# Patient Record
Sex: MALE | Race: WHITE | Employment: FULL TIME | URBAN - METROPOLITAN AREA
[De-identification: names, ages, dates, MRNs, and addresses within clinical notes are randomized per-mention and may not be internally consistent; named-entity substitution may affect disease eponyms.]

---

## 2017-10-26 ENCOUNTER — OFFICE VISIT (OUTPATIENT)
Dept: FAMILY MEDICINE CLINIC | Age: 52
End: 2017-10-26
Payer: COMMERCIAL

## 2017-10-26 VITALS
WEIGHT: 249 LBS | HEIGHT: 67 IN | BODY MASS INDEX: 39.08 KG/M2 | TEMPERATURE: 97.2 F | SYSTOLIC BLOOD PRESSURE: 142 MMHG | HEART RATE: 90 BPM | RESPIRATION RATE: 18 BRPM | DIASTOLIC BLOOD PRESSURE: 70 MMHG | OXYGEN SATURATION: 98 %

## 2017-10-26 DIAGNOSIS — G89.29 CHRONIC PAIN OF RIGHT KNEE: Primary | ICD-10-CM

## 2017-10-26 DIAGNOSIS — I10 ESSENTIAL HYPERTENSION: ICD-10-CM

## 2017-10-26 DIAGNOSIS — M25.462 PAIN AND SWELLING OF KNEE, LEFT: ICD-10-CM

## 2017-10-26 DIAGNOSIS — M25.462 KNEE EFFUSION, LEFT: ICD-10-CM

## 2017-10-26 DIAGNOSIS — E66.2 CLASS 2 OBESITY WITH ALVEOLAR HYPOVENTILATION WITHOUT SERIOUS COMORBIDITY WITH BODY MASS INDEX (BMI) OF 36.0 TO 36.9 IN ADULT (HCC): ICD-10-CM

## 2017-10-26 DIAGNOSIS — M10.9 GOUT OF KNEE, UNSPECIFIED CAUSE, UNSPECIFIED CHRONICITY, UNSPECIFIED LATERALITY: ICD-10-CM

## 2017-10-26 DIAGNOSIS — M25.561 CHRONIC PAIN OF RIGHT KNEE: Primary | ICD-10-CM

## 2017-10-26 DIAGNOSIS — M25.562 PAIN AND SWELLING OF KNEE, LEFT: ICD-10-CM

## 2017-10-26 LAB
ALBUMIN SERPL-MCNC: 4.4 G/DL
ALP BLD-CCNC: 75 U/L
ALT SERPL-CCNC: 20 U/L
ANION GAP SERPL CALCULATED.3IONS-SCNC: 11 MMOL/L
AST SERPL-CCNC: 21 U/L
BASOPHILS ABSOLUTE: 0 /ΜL
BASOPHILS RELATIVE PERCENT: 0.4 %
BILIRUB SERPL-MCNC: 1.3 MG/DL (ref 0.1–1.4)
BUN BLDV-MCNC: 14 MG/DL
CALCIUM SERPL-MCNC: 9.5 MG/DL
CHLORIDE BLD-SCNC: 101 MMOL/L
CO2: 27 MMOL/L
CREAT SERPL-MCNC: 0.9 MG/DL
EOSINOPHILS ABSOLUTE: 0.2 /ΜL
EOSINOPHILS RELATIVE PERCENT: 2.3 %
GFR CALCULATED: >60
GLUCOSE BLD-MCNC: 114 MG/DL
HCT VFR BLD CALC: 43.4 % (ref 41–53)
HEMOGLOBIN: 14.6 G/DL (ref 13.5–17.5)
LYMPHOCYTES ABSOLUTE: 1.8 /ΜL
LYMPHOCYTES RELATIVE PERCENT: 21.8 %
MCH RBC QN AUTO: 31.7 PG
MCHC RBC AUTO-ENTMCNC: 33.7 G/DL
MCV RBC AUTO: 94 FL
MONOCYTES ABSOLUTE: 0.6 /ΜL
MONOCYTES RELATIVE PERCENT: 7.8 %
NEUTROPHILS ABSOLUTE: 5.6 /ΜL
NEUTROPHILS RELATIVE PERCENT: 67.7 %
PDW BLD-RTO: 13.7 %
PLATELET # BLD: 257 K/ΜL
PMV BLD AUTO: 8.7 FL
POTASSIUM SERPL-SCNC: 4.5 MMOL/L
RBC # BLD: 4.62 10^6/ΜL
SODIUM BLD-SCNC: 139 MMOL/L
TOTAL PROTEIN: 7.6
URIC ACID: 7.7
WBC # BLD: 8.3 10^3/ML

## 2017-10-26 PROCEDURE — 99202 OFFICE O/P NEW SF 15 MIN: CPT | Performed by: INTERNAL MEDICINE

## 2017-10-26 PROCEDURE — 96372 THER/PROPH/DIAG INJ SC/IM: CPT | Performed by: INTERNAL MEDICINE

## 2017-10-26 RX ORDER — TRIAMCINOLONE ACETONIDE 40 MG/ML
40 INJECTION, SUSPENSION INTRA-ARTICULAR; INTRAMUSCULAR ONCE
Status: COMPLETED | OUTPATIENT
Start: 2017-10-26 | End: 2017-10-26

## 2017-10-26 RX ORDER — METHYLPREDNISOLONE 4 MG/1
TABLET ORAL
Qty: 1 KIT | Refills: 0 | Status: SHIPPED | OUTPATIENT
Start: 2017-10-26 | End: 2017-11-01

## 2017-10-26 RX ADMIN — TRIAMCINOLONE ACETONIDE 40 MG: 40 INJECTION, SUSPENSION INTRA-ARTICULAR; INTRAMUSCULAR at 11:18

## 2017-10-26 ASSESSMENT — PATIENT HEALTH QUESTIONNAIRE - PHQ9
2. FEELING DOWN, DEPRESSED OR HOPELESS: 0
SUM OF ALL RESPONSES TO PHQ9 QUESTIONS 1 & 2: 0
SUM OF ALL RESPONSES TO PHQ QUESTIONS 1-9: 0
1. LITTLE INTEREST OR PLEASURE IN DOING THINGS: 0

## 2017-10-26 ASSESSMENT — ENCOUNTER SYMPTOMS
BACK PAIN: 0
COLOR CHANGE: 1
DIARRHEA: 0
SHORTNESS OF BREATH: 0
CONSTIPATION: 0
ABDOMINAL PAIN: 0

## 2017-10-26 NOTE — PROGRESS NOTES
Visit Information    Have you changed or started any medications since your last visit including any over-the-counter medicines, vitamins, or herbal medicines? no   Are you having any side effects from any of your medications? -  no  Have you stopped taking any of your medications? Is so, why? -  no    Have you seen any other physician or provider since your last visit? Yes - Records Obtained  Have you had any other diagnostic tests since your last visit? Yes - Records Obtained  Have you been seen in the emergency room and/or had an admission to a hospital since we last saw you? Yes - Records Obtained  Have you had your routine dental cleaning in the past 6 months? no    Have you activated your [employer] account? If not, what are your barriers?  No: Discussed with patient      Patient Care Team:  Sia Trujillo DO as PCP - General (Family Medicine)    Medical History Review  Past Medical, Family, and Social History reviewed and does not contribute to the patient presenting condition    Health Maintenance   Topic Date Due    Hepatitis C screen  1965    HIV screen  12/24/1979    DTaP/Tdap/Td vaccine (1 - Tdap) 12/24/1983    Lipid screen  12/24/2004    Diabetes screen  12/24/2004    Colon cancer screen colonoscopy  12/24/2014    Flu vaccine (1) 09/01/2017

## 2017-10-26 NOTE — PROGRESS NOTES
[address] [address]  Dept: [phone]  Dept Fax: [phone]    Sergo Robert is a 45 y.o. male who presents today for his medical conditions/complaints as noted below. Sergo Robert is c/o of   Chief Complaint   Patient presents with    Knee Pain     Right knee pain since august , twisted knee was seen in ER, swollen         HPI:     [facility] but no fall or LOC. Has never really had joint issues before. Went to ED wih=thin first week of injury due to pain ans swelling and issues with movement. They treated it as acute straoin /pain at that time . Pt was given totardol and then small script for naproxen    [facility] ethat time pt state ssymptoms have gradually worsned. He states increase in swelling and redness to that left knee. States  sever pain with any standing or twising movement. jsut some some throbbing when he is at rest and/or sitting. Has not fallen due to weakness or pain but states his knee will pop occassionally and \"give out \" since initial injury . No prior issues with that knee . Has last close to 100 lbs in the last 2 years. Has not seen a PCP. Has done ibuprofen /naproxen given by ED initially. Has also been trying tylenol arthritis recently. Has also been using a knee brace for the last 6 weeks while at work . Rest, ice and elevation while not at work but does work as a  so on feet a lot at work. Also has tried [place], tiger balm, Aspercreme for several weeks over the last 2 months for the pian without any relief. No fever or chills. No cp/sob. No leg swelling . Some stretching but no PT or real exercises at home. Knee Pain    The incident occurred more than 1 week ago. The incident occurred at work. The injury mechanism was a twisting injury and an inversion injury. The pain is present in the left knee and left ankle.  The pain is at a shortness of breath. Cardiovascular: Negative for chest pain, palpitations and leg swelling. Gastrointestinal: Negative for abdominal pain, constipation and diarrhea. Musculoskeletal: Positive for gait problem and joint swelling. Negative for arthralgias, back pain, myalgias, neck pain and neck stiffness. Skin: Positive for color change. Negative for pallor, rash and wound. Neurological: Negative for dizziness, numbness and headaches. Psychiatric/Behavioral: Negative for sleep disturbance. Objective:     Physical Exam   Constitutional: He is oriented to person, place, and time. He appears well-developed and well-nourished. Cardiovascular: Normal rate, regular rhythm and normal heart sounds. Pulmonary/Chest: Effort normal and breath sounds normal.   Abdominal: Soft. Bowel sounds are normal. There is no splenomegaly or hepatomegaly. There is no tenderness. Neurological: He is alert and oriented to person, place, and time. No cranial nerve deficit. Skin: Skin is warm and dry. No rash noted. Psychiatric: He has a normal mood and affect. Nursing note and vitals reviewed. BP (!) 142/70 (Site: Left Arm, Position: Sitting, Cuff Size: Large Adult)   Pulse 90   Temp 97.2 °F (36.2 °C) (Tympanic)   Resp 18   Ht 5' 7\" (1.702 m)   Wt 249 lb (112.9 kg)   SpO2 98%   BMI 39.00 kg/m²     Assessment:      1. Chronic pain of right knee  MRI Knee Left WO Contrast    methylPREDNISolone (MEDROL DOSEPACK) 4 MG tablet    Amb External Referral To Physical Therapy   2. Pain and swelling of knee, left  XR FOOT LEFT (MIN 3 VIEWS)    MRI Knee Left WO Contrast    triamcinolone acetonide (KENALOG-40) injection 40 mg    methylPREDNISolone (MEDROL DOSEPACK) 4 MG tablet    Amb External Referral To Physical Therapy    Comprehensive Metabolic Panel   3.  Knee effusion, left  XR FOOT LEFT (MIN 3 VIEWS)    MRI Knee Left WO Contrast    triamcinolone acetonide (KENALOG-40) injection 40 mg    methylPREDNISolone

## 2017-11-06 ENCOUNTER — HOSPITAL ENCOUNTER (OUTPATIENT)
Dept: MRI IMAGING | Age: 52
Discharge: HOME | End: 2017-11-06
Payer: COMMERCIAL

## 2017-11-06 DIAGNOSIS — G89.29 CHRONIC PAIN OF RIGHT KNEE: ICD-10-CM

## 2017-11-06 DIAGNOSIS — M25.462 KNEE EFFUSION, LEFT: ICD-10-CM

## 2017-11-06 DIAGNOSIS — M25.462 PAIN AND SWELLING OF KNEE, LEFT: ICD-10-CM

## 2017-11-06 DIAGNOSIS — M25.562 PAIN AND SWELLING OF KNEE, LEFT: ICD-10-CM

## 2017-11-06 DIAGNOSIS — M25.561 CHRONIC PAIN OF RIGHT KNEE: ICD-10-CM

## 2017-11-06 PROCEDURE — 73721 MRI JNT OF LWR EXTRE W/O DYE: CPT

## 2017-11-07 DIAGNOSIS — M84.451A SUBCHONDRAL INSUFFICIENCY FRACTURE OF CONDYLE OF RIGHT FEMUR, INITIAL ENCOUNTER (HCC): Primary | ICD-10-CM

## 2017-11-08 ENCOUNTER — OFFICE VISIT (OUTPATIENT)
Dept: ORTHOPEDIC SURGERY | Age: 52
End: 2017-11-08
Payer: COMMERCIAL

## 2017-11-08 VITALS — HEIGHT: 67 IN | BODY MASS INDEX: 37.2 KG/M2 | WEIGHT: 237 LBS

## 2017-11-08 DIAGNOSIS — S83.231A COMPLEX TEAR OF MEDIAL MENISCUS OF RIGHT KNEE, UNSPECIFIED WHETHER OLD OR CURRENT TEAR, INITIAL ENCOUNTER: Primary | ICD-10-CM

## 2017-11-08 DIAGNOSIS — M17.11 ARTHRITIS OF RIGHT KNEE: ICD-10-CM

## 2017-11-08 DIAGNOSIS — S83.271A COMPLEX TEAR OF LATERAL MENISCUS OF RIGHT KNEE, UNSPECIFIED WHETHER OLD OR CURRENT TEAR, INITIAL ENCOUNTER: ICD-10-CM

## 2017-11-08 PROCEDURE — 99243 OFF/OP CNSLTJ NEW/EST LOW 30: CPT | Performed by: ORTHOPAEDIC SURGERY

## 2017-11-08 RX ORDER — METHYLPREDNISOLONE 4 MG/1
4 TABLET ORAL SEE ADMIN INSTRUCTIONS
COMMUNITY
End: 2017-11-15 | Stop reason: ALTCHOICE

## 2017-11-08 ASSESSMENT — ENCOUNTER SYMPTOMS
TROUBLE SWALLOWING: 0
CONSTIPATION: 0
ABDOMINAL PAIN: 0
SHORTNESS OF BREATH: 0
VOICE CHANGE: 0
COUGH: 0
NAUSEA: 0
WHEEZING: 0
VOMITING: 0
CHOKING: 0
DIARRHEA: 0

## 2017-11-08 NOTE — LETTER
Dr. Courtney Zuñiga  [facility]  [address] [address]  Dept: [phone]  Dept Fax: [phone]    11/19/17    Patient: Jaylon Hannon  YOB: 1965    Dear Dorys Justice, ,    I had the pleasure of seeing one of your patients, Ksenia Knows today in the office. Below are the relevant portions of my assessment and plan of care. IMPRESSION:   1. Complex tear of medial meniscus of right knee, unspecified whether old or current tear, initial encounter    2. Arthritis of right knee    3. Complex tear of lateral meniscus of right knee, unspecified whether old or current tear, initial encounter      PLAN:   Discussed etiologies and natural histories of right knee meniscal tear and right knee arthritis. The bone marrow edema noted in the medial femoral condyle and medial femoral distal metaphysis is most consistent with arthritic changes especially with the patient's history of trauma. The treatment options may include anti inflammatories, bracing, injections further imaging, and last resort surgery. The patient opted for non-operative treatment and will finish up his medrol dose pack. If pain starts to flare up options would include anti-inflammatory, PO steroids, and cortisone injection. Patient can be activity as tolerated. Return to clinic as needed. Call the office immediately with any problems. Thank you for allowing me to participate in the care of this patient. I will keep you updated on this patient's follow up and I look forward to serving you and your patients again in the future. Please don't hesitate to contact me at my mobile number [phone].         Ernst Arizmendi

## 2017-11-08 NOTE — PROGRESS NOTES
[facility]  [facility]  [address] [place] [address] [address] [facility] [address]  Dept: [phone]    Ambulatory Orthopedic Consult      CHIEF COMPLAINT:    Chief Complaint   Patient presents with    Knee Pain     right        HISTORY OF PRESENT ILLNESS:      The patient is a 45 y.o. male who is being seen at the request of  Jesus Manuel Cruz DO for consultation and evaluation of right knee pain notes pain for at least 3 months. He denies inciting trauma however he states he climbed 13 flights of stairs approximately 3 months ago noticed pain approximately care home up the stairs. He notes marked improvement with the Medrol Dosepak. He is also try Tylenol PM and Advil PM.  He has also tried knee brace and topical anti-inflammatories. He notes pain with walking long distances. He has a history of gout in the left knee and the left foot. Past Medical History:    Past Medical History:   Diagnosis Date    Sleep apnea        Past Surgical History:    Past Surgical History:   Procedure Laterality Date    CHOLECYSTECTOMY      HERNIA REPAIR      MECKEL DIVERTICULUM EXCISION         Current Medications:   Current Outpatient Prescriptions   Medication Sig Dispense Refill    traMADol (ULTRAM) 50 MG tablet Take 1 tablet by mouth every 8 hours as needed for Pain . 25 tablet 0     No current facility-administered medications for this visit. Allergies:    Review of patient's allergies indicates no known allergies. Social History:   Social History     Social History    Marital status:      Spouse name: N/A    Number of children: N/A    Years of education: N/A     Occupational History    Not on file.      Social History Main Topics    Smoking status: Never Smoker    Smokeless tobacco: Never Used    Alcohol use No    Drug use: No    Sexual activity: Not on file     Other Topics Concern    Not on file     Social History Narrative    No narrative on file       Family History:  No family history on file. REVIEW OF SYSTEMS:  Review of Systems   Constitutional: Negative for fever. HENT: Positive for tinnitus. Negative for ear discharge, ear pain, hearing loss, trouble swallowing and voice change. Respiratory: Negative for cough, choking, shortness of breath and wheezing. Cardiovascular: Negative for chest pain, palpitations and leg swelling. Gastrointestinal: Negative for abdominal pain, constipation, diarrhea, nausea and vomiting. Musculoskeletal: Positive for arthralgias. Neurological: Negative for dizziness. I have reviewed the CC, HPI, ROS, PMH, FHX, Social History. I agree with the documentation provided by other staff, residents, and/or medical students and have reviewed their documentation prior to providing my signature indicating agreement. PHYSICAL EXAM:  Ht 5' 7\" (1.702 m)   Wt 237 lb (107.5 kg)   BMI 37.12 kg/m²  Body mass index is 37.12 kg/m². Physical Exam  Gen: alert and oriented  Psych:  Appropriate affect; Appropriate knowledge base; Appropriate mood; No hallucinations; Head: normocephalic atraumatic   Chest: symmetric chest excursion  Pelvis: stable  Ortho Exam  Extremity:  Evaluation of the Right knee reveals no significant outward deformity. There is no erythema, warmth, skin lesions, signs of infection. There is tenderness over the medial joint line. There is a mildknee effusion. Range of motion of the Right knee is  full. No instability of the knee is appreciated at 0 and 30° of flexion. There is a negative anterior drawer Lachman's test.  There is increased pain with varus Vernell's testing. No calf tenderness is noted. There is a negative hip log roll and Stinchfield test.  Motor, sensory, vascular examination to the Right lower extremity is intact. Patient has full range of motion of the ankle.     Radiology:     Narrative   EXAMINATION:   MRI OF THE RIGHT KNEE WITHOUT CONTRAST, 11/6/2017 7:25 am       TECHNIQUE:   Multiplanar multisequence MRI of the right knee was performed without the   administration of intravenous contrast.       COMPARISON:   None       HISTORY:   ORDERING SYSTEM PROVIDED HISTORY: Chronic pain of right knee   TECHNOLOGIST PROVIDED HISTORY:   Reason for exam:->knee pain , swelling for 2 months   Ordering Physician Provided Reason for Exam: twisting injury 2 months ago.    Acuity: Chronic   Type of Exam: Subsequent/Follow-up   Additional signs and symptoms: pain and swelling since   Relevant Medical/Surgical History: c/o pain anterior and medially to right   knee       77-year-old male with chronic right knee pain ; patient complains of anterior   and medial right knee pain and swelling since twisting injury 2 months ago       FINDINGS:   MENISCI: There is abnormal obliquely oriented increased proton density signal   extending to the undersurface and superior articular surface in the body of   the medial meniscus with outward extrusion of the body of the medial meniscus   as seen on coronal images 13 through 18, series 6.       There is abnormal increased proton density signal which is horizontally and   obliquely oriented along the free edge of the body and posterior horn of the   lateral meniscus on sagittal images 6 through 9, series 7.       CRUCIATE LIGAMENTS: Anterior and posterior cruciate ligaments appear grossly   intact.       EXTENSOR MECHANISM: Distal quadriceps tendon and patellar retinacula appear   grossly intact.  Mild inferior patellar tendinosis.       LATERAL COLLATERAL LIGAMENT COMPLEX: Popliteus muscle/ tendon, iliotibial   band, biceps femoris, and lateral collateral ligament appear grossly intact.       MEDIAL COLLATERAL LIGAMENT COMPLEX: Medial collateral ligament complex   appears continuous and grossly intact.       KNEE JOINT: Small to moderate debris containing joint effusion.       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       Grade II chondromalacia of the lateral compartment articular cartilage.       Grade III-IV chondromalacia of the central and surrounding medial and lateral   femoral trochlear articular cartilage with underlying subcortical cystic and   subchondral reactive marrow changes.       Diffuse grade II-III chondromalacia of the remainder of the patellofemoral   compartment articular cartilage.       BONE MARROW:       Subchondral subtle linear low T2 signal on coronal images 13 through 14,   series 6 with extensive marrow edema at the medial femoral condyle extending   into the medial half of the distal right femoral metaphysis on image 14,   series 6.  The subchondral low T2 signal is also partially seen on image 26,   series 8 and series 7.       Mild tricompartmental osteophyte spurring.       Bone marrow signal intensity within the remaining visualized osseous   structures is otherwise grossly unremarkable.       SOFT TISSUES: Visualized popliteal neurovascular bundle grossly unremarkable.       No sizable Baker's cyst.       Mild to moderate edema in the subcutaneous fat along the anterior aspect of   the knee anterior to the patella and patellar tendon.           Impression   1. Findings highly suspicious for a nondisplaced subchondral fracture at the   medial femoral condyle with extensive marrow edema throughout the medial   femoral condyle and medial distal right femoral metaphysis. 2. Oblique tearing involving both articular surfaces in the body of the   medial meniscus with partial outward extrusion of the body of the medial   meniscus. 3. Complex multidirectional meniscal tearing along the free edge of the body   and posterior horn of the lateral meniscus.    4. 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 No orders of the defined types were placed in this encounter.        Electronically signed by Braxton Campos DO, FAOAO on 11/19/2017 at 4:52 PM

## 2017-11-15 ENCOUNTER — OFFICE VISIT (OUTPATIENT)
Dept: FAMILY MEDICINE CLINIC | Age: 52
End: 2017-11-15
Payer: COMMERCIAL

## 2017-11-15 VITALS
WEIGHT: 236.99 LBS | HEART RATE: 94 BPM | RESPIRATION RATE: 18 BRPM | DIASTOLIC BLOOD PRESSURE: 80 MMHG | SYSTOLIC BLOOD PRESSURE: 134 MMHG | BODY MASS INDEX: 37.2 KG/M2 | HEIGHT: 67 IN | TEMPERATURE: 97.8 F | OXYGEN SATURATION: 98 %

## 2017-11-15 DIAGNOSIS — M10.9 GOUT OF KNEE, UNSPECIFIED CAUSE, UNSPECIFIED CHRONICITY, UNSPECIFIED LATERALITY: ICD-10-CM

## 2017-11-15 DIAGNOSIS — M25.562 PAIN AND SWELLING OF KNEE, LEFT: ICD-10-CM

## 2017-11-15 DIAGNOSIS — M25.462 PAIN AND SWELLING OF KNEE, LEFT: ICD-10-CM

## 2017-11-15 DIAGNOSIS — M25.561 ACUTE PAIN OF RIGHT KNEE: ICD-10-CM

## 2017-11-15 DIAGNOSIS — S83.206D TEAR OF MENISCUS OF RIGHT KNEE AS CURRENT INJURY, UNSPECIFIED MENISCUS, UNSPECIFIED TEAR TYPE, SUBSEQUENT ENCOUNTER: Primary | ICD-10-CM

## 2017-11-15 DIAGNOSIS — M25.462 KNEE EFFUSION, LEFT: ICD-10-CM

## 2017-11-15 DIAGNOSIS — Z23 IMMUNIZATION DUE: ICD-10-CM

## 2017-11-15 PROCEDURE — 99213 OFFICE O/P EST LOW 20 MIN: CPT | Performed by: INTERNAL MEDICINE

## 2017-11-15 RX ORDER — TRAMADOL HYDROCHLORIDE 50 MG/1
50 TABLET ORAL EVERY 8 HOURS PRN
Qty: 25 TABLET | Refills: 0 | Status: SHIPPED | OUTPATIENT
Start: 2017-11-15 | End: 2017-11-29 | Stop reason: ALTCHOICE

## 2017-11-15 ASSESSMENT — PATIENT HEALTH QUESTIONNAIRE - PHQ9
1. LITTLE INTEREST OR PLEASURE IN DOING THINGS: 0
2. FEELING DOWN, DEPRESSED OR HOPELESS: 0
SUM OF ALL RESPONSES TO PHQ9 QUESTIONS 1 & 2: 0
SUM OF ALL RESPONSES TO PHQ QUESTIONS 1-9: 0

## 2017-11-15 ASSESSMENT — ENCOUNTER SYMPTOMS
COLOR CHANGE: 1
ABDOMINAL PAIN: 0
DIARRHEA: 0
CONSTIPATION: 0
BACK PAIN: 0

## 2017-11-15 NOTE — PATIENT INSTRUCTIONS
Patient Education        Knee: Exercises  Your Care Instructions  Here are some examples of exercises for your knee. Start each exercise slowly. Ease off the exercise if you start to have pain. Your doctor or physical therapist will tell you when you can start these exercises and which ones will work best for you. How to do the exercises  Quad sets    1. Sit with your leg straight and supported on the floor or a firm bed. (If you feel discomfort in the front or back of your knee, place a small towel roll under your knee.)  2. Tighten the muscles on top of your thigh by pressing the back of your knee flat down to the floor. (If you feel discomfort under your kneecap, place a small towel roll under your knee.)  3. Hold for about 6 seconds, then rest for up to 10 seconds. 4. Do 8 to 12 repetitions several times a day. Straight-leg raises to the front    1. Lie on your back with your good knee bent so that your foot rests flat on the floor. Your injured leg should be straight. Make sure that your low back has a normal curve. You should be able to slip your flat hand in between the floor and the small of your back, with your palm touching the floor and your back touching the back of your hand. 2. Tighten the thigh muscles in the injured leg by pressing the back of your knee flat down to the floor. Hold your knee straight. 3. Keeping the thigh muscles tight, lift your injured leg up so that your heel is about 12 inches off the floor. Hold for about 6 seconds and then lower slowly. 4. Do 8 to 12 repetitions, 3 times a day. Straight-leg raises to the outside    1. Lie on your side, with your injured leg on top. 2. Tighten the front thigh muscles of your injured leg to keep your knee straight. 3. Keep your hip and your leg straight in line with the rest of your body, and keep your knee pointing forward. Do not drop your hip back.   4. Lift your injured leg straight up toward the ceiling, about 12 inches off the floor. Hold for about 6 seconds, then slowly lower your leg. 5. Do 8 to 12 repetitions. Straight-leg raises to the back    1. Lie on your stomach, and lift your leg straight up behind you (toward the ceiling). 2. Lift your toes about 6 inches off the floor, hold for about 6 seconds, then lower slowly. 3. Do 8 to 12 repetitions. Straight-leg raises to the inside    1. Lie on the side of your body with the injured leg. 2. You can either prop your other (good) leg up on a chair, or you can bend your good knee and put that foot in front of your injured knee. Do not drop your hip back. 3. Tighten the muscles on the front of your thigh to straighten your injured knee. 4. Keep your kneecap pointing forward, and lift your whole leg up toward the ceiling about 6 inches. Hold for about 6 seconds, then lower slowly. 5. Do 8 to 12 repetitions. Heel dig bridging    1. Lie on your back with both knees bent and your ankles bent so that only your heels are digging into the floor. Your knees should be bent about 90 degrees. 2. Then push your heels into the floor, squeeze your buttocks, and lift your hips off the floor until your shoulders, hips, and knees are all in a straight line. 3. Hold for about 6 seconds as you continue to breathe normally, and then slowly lower your hips back down to the floor and rest for up to 10 seconds. 4. Do 8 to 12 repetitions. Hamstring curls    1. Lie on your stomach with your knees straight. If your kneecap is uncomfortable, roll up a washcloth and put it under your leg just above your kneecap. 2. Lift the foot of your injured leg by bending the knee so that you bring the foot up toward your buttock. If this motion hurts, try it without bending your knee quite as far. This may help you avoid any painful motion. 3. Slowly lower your leg back to the floor. 4. Do 8 to 12 repetitions.   5. With permission from your doctor or physical therapist, you may also want to add a cuff weight to your ankle (not more than 5 pounds). With weight, you do not have to lift your leg more than 12 inches to get a hamstring workout. Shallow standing knee bends    Note: Do this exercise only if you have very little pain; if you have no clicking, locking, or giving way if you have an injured knee; and if it does not hurt while you are doing 8 to 12 repetitions. 1. Stand with your hands lightly resting on a counter or chair in front of you. Put your feet shoulder-width apart. 2. Slowly bend your knees so that you squat down like you are going to sit in a chair. Make sure your knees do not go in front of your toes. 3. Lower yourself about 6 inches. Your heels should remain on the floor at all times. 4. Rise slowly to a standing position. Heel raises    1. Stand with your feet a few inches apart, with your hands lightly resting on a counter or chair in front of you. 2. Slowly raise your heels off the floor while keeping your knees straight. 3. Hold for about 6 seconds, then slowly lower your heels to the floor. 4. Do 8 to 12 repetitions several times during the day. Follow-up care is a key part of your treatment and safety. Be sure to make and go to all appointments, and call your doctor if you are having problems. It's also a good idea to know your test results and keep a list of the medicines you take. Where can you learn more? Go to https://Xinyi Network[employer]rowdEngineering.Rate Solutions. org and sign in to your Daptiv account. Enter F155 in the [facility] box to learn more about \"Knee: Exercises. \"     If you do not have an account, please click on the \"Sign Up Now\" link. Current as of: March 21, 2017  Content Version: 11.3  © 5932-9427 [employer]. Care instructions adapted under license by [facility]).  If you have questions about a medical condition or this instruction, always ask your healthcare professional. [place] [address] any warranty or liability for your use of this information. Patient Education        Meniscus Tear: Exercises  Your Care Instructions  Here are some examples of typical rehabilitation exercises for your condition. Start each exercise slowly. Ease off the exercise if you start to have pain. Your doctor or physical therapist will tell you when you can start these exercises and which ones will work best for you. How to do the exercises  Calf wall stretch    5. Stand facing a wall with your hands on the wall at about eye level. Put your affected leg about a step behind your other leg. 6. Keeping your back leg straight and your back heel on the floor, bend your front knee and gently bring your hip and chest toward the wall until you feel a stretch in the calf of your back leg. 7. Hold the stretch for at least 15 to 30 seconds. 8. Repeat 2 to 4 times. 9. Repeat steps 1 through 4, but this time keep your back knee bent. Hamstring wall stretch    1. Lie on your back in a doorway, with your good leg through the open door. 2. Slide your affected leg up the wall to straighten your knee. You should feel a gentle stretch down the back of your leg. ¨ Do not arch your back. ¨ Do not bend either knee. ¨ Keep one heel touching the floor and the other heel touching the wall. Do not point your toes. 3. Hold the stretch for at least 1 minute. Then over time, try to lengthen the time you hold the stretch to as long as 6 minutes. 4. Repeat 2 to 4 times. If you do not have a place to do this exercise in a doorway, there is another way to do it:  6. Lie on your back, and bend your affected leg. 7. Loop a towel under the ball and toes of that foot, and hold the ends of the towel in your hands. 8. Straighten your knee, and slowly pull back on the towel. You should feel a gentle stretch down the back of your leg. 9. Hold the stretch for at least 15 to 30 seconds. Or even better, hold the stretch for 1 minute if you can. 10. Repeat 2 to 4 times. Quad sets    4.  Sit want to add a cuff weight to your ankle (not more than 5 pounds). With weight, you do not have to lift your leg more than 12 inches to get a hamstring workout. Wall slide with ball squeeze    5. Stand with your back against a wall and with your feet about shoulder-width apart. Your feet should be about 12 inches away from the wall. 6. Put a ball about the size of a soccer ball between your knees. Then slowly slide down the wall until your knees are bent about 20 to 30 degrees. 7. Tighten your thigh muscles by squeezing the ball between your knees. Hold that position for about 10 seconds, then stop squeezing. Rest for up to 10 seconds between repetitions. 8. Repeat 8 to 12 times. Heel raises    5. Stand with your feet a few inches apart, with your hands lightly resting on a counter or chair in front of you. 6. Slowly raise your heels off the floor while keeping your knees straight. 7. Hold for about 6 seconds, then slowly lower your heels to the floor. 8. Do 8 to 12 repetitions several times during the day. Heel dig bridging    Note: Stop doing this exercise if it causes pain. 1. Lie on your back with both knees bent and your ankles bent so that only your heels are digging into the floor. Your knees should be bent about 90 degrees. 2. Then push your heels into the floor, squeeze your buttocks, and lift your hips off the floor until your shoulders, hips, and knees are all in a straight line. 3. Hold for about 6 seconds as you continue to breathe normally, and then slowly lower your hips back down to the floor and rest for up to 10 seconds. 4. Do 8 to 12 repetitions. Shallow standing knee bends    Note: Do this exercise only if you have very little pain; if you have no clicking, locking, or giving way in the injured knee; and if it does not hurt while you are doing 8 to 12 repetitions. 1. Stand with your hands lightly resting on a counter or chair in front of you.  Put your feet shoulder-width enough to a wall so that you can place both legs up on the wall. Your hips should be as close to the wall as is comfortable for you. 6. Start with both feet resting on the wall. Slowly let the foot of your affected leg slide down the wall until you feel a stretch in your knee. 7. Hold for 15 to 30 seconds. 8. Then slowly slide your foot up to where you started. 9. Repeat 2 to 4 times. Quad sets    11. Sit with your affected leg straight and supported on the floor or a firm bed. Place a small, rolled-up towel under your knee. Your other leg should be bent, with that foot flat on the floor. 12. Tighten the thigh muscles of your affected leg by pressing the back of your knee down into the towel. 13. Hold for about 6 seconds, then rest for up to 10 seconds. 14. Repeat 8 to 12 times. Short-arc quad    8. Lie on your back with your knees bent over a foam roll or a large rolled-up towel. 9. Lift the lower part of your affected leg and straighten your knee by tightening your thigh muscle. Keep the bottom of your knee on the foam roll or rolled-up towel. 10. Hold your knee straight for about 6 seconds, then slowly bend your knee and lower your leg back to the floor. Rest for up to 10 seconds between repetitions. 11. Repeat 8 to 12 times. Straight-leg raises to the front    10. Lie on your back with your good knee bent so that your foot rests flat on the floor. Your affected leg should be straight. Make sure that your low back has a normal curve. You should be able to slip your hand in between the floor and the small of your back, with your palm touching the floor and your back touching the back of your hand. 11. Tighten the thigh muscles in your affected leg by pressing the back of your knee flat down to the floor. Hold your knee straight. 12. Keeping the thigh muscles tight and your leg straight, lift your affected leg up so that your heel is about 12 inches off the floor.  Hold for about 6 seconds, then lower slowly. 13. Relax for up to 10 seconds between repetitions. 14. Repeat 8 to 12 times. Hamstring set (heel dig)    8. Sit with your affected leg bent. Your good leg should be straight and supported on the floor. 9. Tighten the muscles on the back of your bent leg (hamstring) by pressing your heel into the floor. 10. Hold for about 6 seconds, then rest for up to 10 seconds. 11. Repeat 8 to 12 times. Hip adduction    Note: You will need a pillow for this exercise. 11. Sit on the floor with your knees bent. 12. Place a pillow between your knees. 13. Put your hands slightly behind your hips for support. 14. Squeeze the pillow by tightening the muscles on the inside of your thighs. 15. Hold for 6 seconds, then rest for up to 10 seconds. 16. Repeat 8 to 12 times. Hip abduction    Note: You will need a small pillow for this exercise. 9. Sit on the floor with your affected knee close to a wall. [address] your affected knee but keep the other leg straight in front of you. 11. Place a pillow between the outside of your knee and the wall. 12. Put your hands slightly behind your hips for support. 13. Push the outside of your knee against the pillow and the wall. 14. Hold for 6 seconds, then rest for up to 10 seconds. 15. Repeat 8 to 12 times. Lateral step-up    9. Stand sideways on the bottom step of a staircase with your injured leg on the step and your other foot on the floor. Hold on to the banister or wall. 10. Use your injured leg to raise yourself up, bringing your other foot level with the stair step. Make sure to keep your hips level as you do this. And try to keep your knee moving in a straight line with your middle toe. Do not put the foot you are raising on the stair step. 11. Slowly lower your foot back down. 12. Repeat 8 to 12 times. Wall squats with ball    Note: You will need a large therapy ball for this exercise.  Ask your doctor or physical therapist what size you will need, but it should be large enough to cover your back. 5. Stand with your back facing a wall. Place your feet about a shoulder-width apart. 6. Place the therapy ball between your back and the wall, and move your feet out in front of you so they are about a foot in front of your hips. 7. Keep your arms at your sides, or put your hands on your hips. 8. Slowly squat down as if you are going to sit in a chair, rolling your back over the ball as you squat. The ball should move with you but stay pressed into the wall. 9. Be sure that your knees do not go in front of your toes as you squat. 10. Hold for 6 seconds. 11. Slowly rise to your standing position. 12. Repeat 8 to 12 times. Follow-up care is a key part of your treatment and safety. Be sure to make and go to all appointments, and call your doctor if you are having problems. It's also a good idea to know your test results and keep a list of the medicines you take. Where can you learn more? Go to https://[employer]ey[employer].Golfshop Online. org and sign in to your Itaro account. Enter R100 in the [employer] box to learn more about \"Medial Collateral Ligament Sprain: Rehab Exercises. \"     If you do not have an account, please click on the \"Sign Up Now\" link. Current as of: March 21, 2017  Content Version: 11.3  © 3702-2462 [employer]. Care instructions adapted under license by [facility] ([facility]). If you have questions about a medical condition or this instruction, always ask your healthcare professional. [place] [address] any warranty or liability for your use of this information.

## 2017-11-15 NOTE — PROGRESS NOTES
Cardiovascular: Negative for chest pain. Gastrointestinal: Negative for abdominal pain, constipation and diarrhea. Musculoskeletal: Positive for arthralgias, gait problem and joint swelling. Negative for back pain, myalgias, neck pain and neck stiffness. Skin: Positive for color change. Negative for pallor, rash and wound. Neurological: Negative for tingling, numbness and headaches. Psychiatric/Behavioral: Negative for sleep disturbance. Objective:     Physical Exam   Constitutional: He is oriented to person, place, and time. He appears well-developed and well-nourished. Neck: Normal range of motion. Neck supple. Cardiovascular: Normal rate, regular rhythm and normal heart sounds. Exam reveals no gallop. No murmur heard. Pulmonary/Chest: Effort normal and breath sounds normal.   Abdominal: Soft. Bowel sounds are normal. There is no splenomegaly or hepatomegaly. There is no tenderness. Musculoskeletal:        Right knee: He exhibits decreased range of motion, swelling, effusion and MCL laxity. He exhibits no ecchymosis, no deformity, no laceration, no erythema and normal alignment. Tenderness found. Medial joint line tenderness noted. Left knee: He exhibits normal range of motion, no swelling, no effusion, no ecchymosis and no deformity. Tenderness found. Patellar tendon tenderness noted. Neurological: He is alert and oriented to person, place, and time. No cranial nerve deficit or sensory deficit. Skin: Skin is warm and dry. No rash noted. Psychiatric: He has a normal mood and affect. Nursing note and vitals reviewed. /80 (Site: Left Arm, Position: Sitting, Cuff Size: Large Adult)   Pulse 94   Temp 97.8 °F (36.6 °C) (Tympanic)   Resp 18   Ht 5' 7.01\" (1.702 m)   Wt 236 lb 15.9 oz (107.5 kg)   SpO2 98%   BMI 37.11 kg/m²     Assessment:      1.  Tear of meniscus of right knee as current injury, unspecified meniscus, unspecified tear type, subsequent encounter traMADol (ULTRAM) 50 MG tablet   2. Acute pain of right knee  traMADol (ULTRAM) 50 MG tablet   3. Immunization due               Plan:      Return if symptoms worsen or fail to improve, for annual exam.    No orders of the defined types were placed in this encounter. Orders Placed This Encounter   Medications    traMADol (ULTRAM) 50 MG tablet     Sig: Take 1 tablet by mouth every 8 hours as needed for Pain . Dispense:  25 tablet     Refill:  0        Advised patient to get in with  in the next few days to try the intraarticular steroid injection as this can help him walk and help with pain for 2-3 months , much longer than the oral medication. Advised pt we do not have functioning xray here right now for left knee. Given an extended amount of home exercises to use since insurance refused PT, included specific meniscal tear exercises. Continue other conservative measures he has been doing but limit ibuprofen if possible in the long term. Will give a small script for tramadol low dose. Also needs routine physical follow up and further HM discussion . Refuses flu vaccine. All labs we ordered turned out normal/okay except uric acid which was 7.7 . I DO not feel current knee symptoms are related to gout but patient has restarted his allopurinol script. Call with any questions or concerns. Controlled substances monitoring: possible medication side effects, risk of tolerance and/or dependence, and alternative treatments discussed, no signs of potential drug abuse or diversion identified and OARRS report reviewed today- activity consistent with treatment plan. Patient given educational materials - see patient instructions. Discussed use, benefit, and side effects of prescribed medications. All patient questions answered. Pt voiced understanding. Reviewed health maintenance. Instructed to continue current medications, diet and exercise. Patient agreed with treatment plan.  Follow up as directed.      Electronically signed by Laura Wilcox DO on 11/15/2017 at 2:33 PM

## 2017-11-15 NOTE — PROGRESS NOTES
Visit Information    Have you changed or started any medications since your last visit including any over-the-counter medicines, vitamins, or herbal medicines? no   Are you having any side effects from any of your medications? -  no  Have you stopped taking any of your medications? Is so, why? -  no    Have you seen any other physician or provider since your last visit? Yes - Records Obtained  Have you had any other diagnostic tests since your last visit? Yes - Records Obtained  Have you been seen in the emergency room and/or had an admission to a hospital since we last saw you? Yes - Records Obtained  Have you had your routine dental cleaning in the past 6 months? no    Have you activated your Basecamp account? If not, what are your barriers?  Yes     Patient Care Team:  China Cerna DO as PCP - General (Family Medicine)    Medical History Review  Past Medical, Family, and Social History reviewed and does not contribute to the patient presenting condition    Health Maintenance   Topic Date Due    Hepatitis C screen  1965    HIV screen  12/24/1979    DTaP/Tdap/Td vaccine (1 - Tdap) 12/24/1983    Lipid screen  12/24/2004    Diabetes screen  12/24/2004    Colon cancer screen colonoscopy  12/24/2014    Flu vaccine (1) 09/01/2017

## 2017-11-20 ENCOUNTER — OFFICE VISIT (OUTPATIENT)
Dept: FAMILY MEDICINE CLINIC | Age: 52
End: 2017-11-20
Payer: COMMERCIAL

## 2017-11-20 VITALS
BODY MASS INDEX: 37.2 KG/M2 | HEIGHT: 67 IN | SYSTOLIC BLOOD PRESSURE: 134 MMHG | RESPIRATION RATE: 18 BRPM | OXYGEN SATURATION: 97 % | TEMPERATURE: 97.3 F | DIASTOLIC BLOOD PRESSURE: 78 MMHG | WEIGHT: 236.99 LBS | HEART RATE: 60 BPM

## 2017-11-20 DIAGNOSIS — S83.241D TEAR OF MEDIAL MENISCUS OF RIGHT KNEE, UNSPECIFIED TEAR TYPE, UNSPECIFIED WHETHER OLD OR CURRENT TEAR, SUBSEQUENT ENCOUNTER: Primary | ICD-10-CM

## 2017-11-20 DIAGNOSIS — M25.461 KNEE EFFUSION, RIGHT: ICD-10-CM

## 2017-11-20 DIAGNOSIS — M25.461 PAIN AND SWELLING OF KNEE, RIGHT: ICD-10-CM

## 2017-11-20 DIAGNOSIS — M25.561 PAIN AND SWELLING OF KNEE, RIGHT: ICD-10-CM

## 2017-11-20 PROCEDURE — 96372 THER/PROPH/DIAG INJ SC/IM: CPT | Performed by: INTERNAL MEDICINE

## 2017-11-20 PROCEDURE — 99213 OFFICE O/P EST LOW 20 MIN: CPT | Performed by: INTERNAL MEDICINE

## 2017-11-20 RX ORDER — PREDNISONE 20 MG/1
TABLET ORAL
Qty: 10 TABLET | Refills: 0 | Status: SHIPPED | OUTPATIENT
Start: 2017-11-20 | End: 2017-11-29 | Stop reason: ALTCHOICE

## 2017-11-20 RX ORDER — TRIAMCINOLONE ACETONIDE 40 MG/ML
40 INJECTION, SUSPENSION INTRA-ARTICULAR; INTRAMUSCULAR ONCE
Status: COMPLETED | OUTPATIENT
Start: 2017-11-20 | End: 2017-11-20

## 2017-11-20 RX ADMIN — TRIAMCINOLONE ACETONIDE 40 MG: 40 INJECTION, SUSPENSION INTRA-ARTICULAR; INTRAMUSCULAR at 11:19

## 2017-11-20 ASSESSMENT — PATIENT HEALTH QUESTIONNAIRE - PHQ9
SUM OF ALL RESPONSES TO PHQ9 QUESTIONS 1 & 2: 0
2. FEELING DOWN, DEPRESSED OR HOPELESS: 0
SUM OF ALL RESPONSES TO PHQ QUESTIONS 1-9: 0
1. LITTLE INTEREST OR PLEASURE IN DOING THINGS: 0

## 2017-11-20 ASSESSMENT — ENCOUNTER SYMPTOMS
ABDOMINAL PAIN: 0
BACK PAIN: 0
CONSTIPATION: 0
COLOR CHANGE: 0
DIARRHEA: 0

## 2017-11-20 NOTE — PROGRESS NOTES
[address] [facility] [address]  Dept: [phone]  Dept Fax: [phone]    Janae Mckinney is a 45 y.o. male who presents today for his medical conditions/complaints as noted below. Janae Mckinney is c/o of   Chief Complaint   Patient presents with    Knee Pain     follow-up, ongoing, no swelling, pain slight improvement          HPI:     Was here Thursday and told to contact  for intrarticular injection. Apparently dr. Susana Gannon is out of the office for the holidays and pt still having persistent pain and difficulty with ambulation. States the tramadol makes him too sleepy so only uses it at night. Knee Pain          No results found for: LABA1C          ( goal A1C is < 7)   No results found for: LABMICR  No results found for: LDLCHOLESTEROL, LDLCALC    (goal LDL is <100)   AST (U/L)   Date Value   10/26/2017 21     ALT (U/L)   Date Value   10/26/2017 20     BUN (mg/dL)   Date Value   10/26/2017 14     BP Readings from Last 3 Encounters:   11/20/17 134/78   11/15/17 134/80   10/26/17 (!) 142/70          (goal 120/80)    Past Medical History:   Diagnosis Date    Sleep apnea       Past Surgical History:   Procedure Laterality Date    CHOLECYSTECTOMY      HERNIA REPAIR      MECKEL DIVERTICULUM EXCISION         History reviewed. No pertinent family history. Social History   Substance Use Topics    Smoking status: Never Smoker    Smokeless tobacco: Never Used    Alcohol use No      Current Outpatient Prescriptions   Medication Sig Dispense Refill    predniSONE (DELTASONE) 20 MG tablet Take 2 tablets once daily for 5 days 10 tablet 0    traMADol (ULTRAM) 50 MG tablet Take 1 tablet by mouth every 8 hours as needed for Pain . 25 tablet 0     No current facility-administered medications for this visit.       No Known Allergies    Health Maintenance   Topic Date Due    Hepatitis C screen  1965    HIV screen

## 2017-11-29 ENCOUNTER — OFFICE VISIT (OUTPATIENT)
Dept: FAMILY MEDICINE CLINIC | Age: 52
End: 2017-11-29
Payer: COMMERCIAL

## 2017-11-29 VITALS
TEMPERATURE: 97.1 F | OXYGEN SATURATION: 98 % | SYSTOLIC BLOOD PRESSURE: 138 MMHG | BODY MASS INDEX: 39.24 KG/M2 | HEIGHT: 67 IN | DIASTOLIC BLOOD PRESSURE: 66 MMHG | WEIGHT: 250 LBS | HEART RATE: 97 BPM | RESPIRATION RATE: 18 BRPM

## 2017-11-29 DIAGNOSIS — R27.0 ATAXIA: ICD-10-CM

## 2017-11-29 DIAGNOSIS — R41.3 MEMORY LOSS: ICD-10-CM

## 2017-11-29 DIAGNOSIS — R42 DIZZINESS: ICD-10-CM

## 2017-11-29 DIAGNOSIS — Z12.11 COLON CANCER SCREENING: Primary | ICD-10-CM

## 2017-11-29 DIAGNOSIS — R26.89 IMBALANCE: ICD-10-CM

## 2017-11-29 DIAGNOSIS — R53.83 FATIGUE, UNSPECIFIED TYPE: ICD-10-CM

## 2017-11-29 DIAGNOSIS — H93.13 BUZZING IN EAR, BILATERAL: ICD-10-CM

## 2017-11-29 PROCEDURE — 99213 OFFICE O/P EST LOW 20 MIN: CPT | Performed by: INTERNAL MEDICINE

## 2017-11-29 RX ORDER — QUETIAPINE FUMARATE 25 MG/1
TABLET, FILM COATED ORAL
Qty: 60 TABLET | Refills: 3 | Status: SHIPPED | OUTPATIENT
Start: 2017-11-29 | End: 2017-12-18

## 2017-11-29 ASSESSMENT — ENCOUNTER SYMPTOMS
CHOKING: 0
SORE THROAT: 0
CONSTIPATION: 0
RHINORRHEA: 0
ABDOMINAL PAIN: 0
TROUBLE SWALLOWING: 0
SHORTNESS OF BREATH: 0
WHEEZING: 0
SINUS PRESSURE: 0
APNEA: 0
COUGH: 0
SINUS PAIN: 0
FACIAL SWELLING: 0
EYE PAIN: 0
DIARRHEA: 0
PHOTOPHOBIA: 0
VOICE CHANGE: 0
EYE REDNESS: 0
STRIDOR: 0
CHEST TIGHTNESS: 0

## 2017-11-29 ASSESSMENT — PATIENT HEALTH QUESTIONNAIRE - PHQ9
2. FEELING DOWN, DEPRESSED OR HOPELESS: 0
1. LITTLE INTEREST OR PLEASURE IN DOING THINGS: 0
SUM OF ALL RESPONSES TO PHQ QUESTIONS 1-9: 0
SUM OF ALL RESPONSES TO PHQ9 QUESTIONS 1 & 2: 0

## 2017-11-29 NOTE — PROGRESS NOTES
Visit Information    Have you changed or started any medications since your last visit including any over-the-counter medicines, vitamins, or herbal medicines? no   Are you having any side effects from any of your medications? -  no  Have you stopped taking any of your medications? Is so, why? -  no    Have you seen any other physician or provider since your last visit? No  Have you had any other diagnostic tests since your last visit? No  Have you been seen in the emergency room and/or had an admission to a hospital since we last saw you? No  Have you had your routine dental cleaning in the past 6 months? no    Have you activated your SquaredOut account? If not, what are your barriers?  Yes     Patient Care Team:  Blair Esposito DO as PCP - General (Family Medicine)    Medical History Review  Past Medical, Family, and Social History reviewed and does contribute to the patient presenting condition    Health Maintenance   Topic Date Due    Hepatitis C screen  1965    HIV screen  12/24/1979    DTaP/Tdap/Td vaccine (1 - Tdap) 12/24/1983    Lipid screen  12/24/2004    Diabetes screen  12/24/2004    Colon cancer screen colonoscopy  12/24/2014    Flu vaccine (1) 11/22/2019 (Originally 9/1/2017)

## 2017-11-29 NOTE — PROGRESS NOTES
neurological issues and denies any family history of such [place] on further questioning he states that he feels a buzzing is so intense recently that he is almost running voices he sometimes states that he hears up to 5 voices and the voices we'll communicate with each other he again repeats that it is likely related to the buzzing in his ears he has never had this symptom before he denies any visual hallucinations or tactile hallucinations he has never been treated for psychiatric illness per our records and patient denies being treated as such he states Sophy Lester is not crazy\" but it is related more to the buzzing that is causing the voices symptom or nausea or vomiting not tried anything for the symptoms. Denies any suicidal or homicidal ideation        No results found for: LABA1C          ( goal A1C is < 7)   No results found for: LABMICR  No results found for: LDLCHOLESTEROL, LDLCALC    (goal LDL is <100)   AST (U/L)   Date Value   10/26/2017 21     ALT (U/L)   Date Value   10/26/2017 20     BUN (mg/dL)   Date Value   10/26/2017 14     BP Readings from Last 3 Encounters:   11/29/17 138/66   11/20/17 134/78   11/15/17 134/80          (goal 120/80)    Past Medical History:   Diagnosis Date    Sleep apnea       Past Surgical History:   Procedure Laterality Date    CHOLECYSTECTOMY      HERNIA REPAIR      MECKEL DIVERTICULUM EXCISION         History reviewed. No pertinent family history. Social History   Substance Use Topics    Smoking status: Never Smoker    Smokeless tobacco: Never Used    Alcohol use No      Current Outpatient Prescriptions   Medication Sig Dispense Refill    QUEtiapine (SEROQUEL) 25 MG tablet Take one tablet daily at bedtime 60 tablet 3     No current facility-administered medications for this visit.       No Known Allergies    Health Maintenance   Topic Date Due    Hepatitis C screen  1965    HIV screen  12/24/1979    DTaP/Tdap/Td vaccine (1 - Tdap) 12/24/1983    Lipid screen sounds normal.   Abdominal: Soft. Bowel sounds are normal. He exhibits no distension and no mass. There is no splenomegaly or hepatomegaly. There is no tenderness. There is no rebound and no guarding. Musculoskeletal: Normal range of motion. Neurological: He is oriented to person, place, and time. He has normal reflexes. He appears lethargic. He displays no atrophy, no tremor and normal reflexes. No cranial nerve deficit or sensory deficit. He exhibits normal muscle tone. He displays no seizure activity. Gait abnormal. Coordination normal.   Skin: Skin is warm and dry. No rash noted. Psychiatric: He has a normal mood and affect. His mood appears not anxious. His speech is delayed. He is slowed and withdrawn. He is not actively hallucinating and not combative. Thought content is not paranoid. Cognition and memory are not impaired. He expresses inappropriate judgment. He does not exhibit a depressed mood. He expresses no homicidal and no suicidal ideation. He expresses no suicidal plans and no homicidal plans. He is inattentive. Nursing note and vitals reviewed. /66 (Site: Left Arm, Position: Sitting, Cuff Size: Large Adult)   Pulse 97   Temp 97.1 °F (36.2 °C) (Tympanic)   Resp 18   Ht 5' 7.01\" (1.702 m)   Wt 250 lb (113.4 kg)   SpO2 98%   BMI 39.15 kg/m²     Assessment:      1. Colon cancer screening  POCT Fecal Immunochemical Test (FIT)   2. Buzzing in ear, bilateral     3. Memory loss  CT HEAD W WO CONTRAST   4. Dizziness  CT HEAD W WO CONTRAST   5. Fatigue, unspecified type  QUEtiapine (SEROQUEL) 25 MG tablet   6. Imbalance  CT HEAD W WO CONTRAST   7. Ataxia  CT HEAD W WO CONTRAST             Plan:      Return in about 2 weeks (around 12/13/2017) for ct results .     Orders Placed This Encounter   Procedures    CT HEAD W WO CONTRAST     Standing Status:   Future     Standing Expiration Date:   11/29/2018     Order Specific Question:   Reason for exam:     Answer:   ataxia, memory loss ,

## 2017-12-01 ENCOUNTER — OFFICE VISIT (OUTPATIENT)
Dept: ORTHOPEDIC SURGERY | Age: 52
End: 2017-12-01
Payer: COMMERCIAL

## 2017-12-01 VITALS — BODY MASS INDEX: 39.71 KG/M2 | WEIGHT: 253 LBS | HEIGHT: 67 IN

## 2017-12-01 DIAGNOSIS — M17.0 PRIMARY OSTEOARTHRITIS OF BOTH KNEES: ICD-10-CM

## 2017-12-01 DIAGNOSIS — M25.562 LEFT KNEE PAIN, UNSPECIFIED CHRONICITY: Primary | ICD-10-CM

## 2017-12-01 PROCEDURE — 99213 OFFICE O/P EST LOW 20 MIN: CPT | Performed by: ORTHOPAEDIC SURGERY

## 2017-12-01 PROCEDURE — 20610 DRAIN/INJ JOINT/BURSA W/O US: CPT | Performed by: ORTHOPAEDIC SURGERY

## 2017-12-01 RX ORDER — METHYLPREDNISOLONE ACETATE 80 MG/ML
80 INJECTION, SUSPENSION INTRA-ARTICULAR; INTRALESIONAL; INTRAMUSCULAR; SOFT TISSUE ONCE
Status: COMPLETED | OUTPATIENT
Start: 2017-12-01 | End: 2017-12-01

## 2017-12-01 RX ORDER — BUPIVACAINE HYDROCHLORIDE 2.5 MG/ML
2 INJECTION, SOLUTION INFILTRATION; PERINEURAL ONCE
Status: COMPLETED | OUTPATIENT
Start: 2017-12-01 | End: 2017-12-01

## 2017-12-01 RX ADMIN — BUPIVACAINE HYDROCHLORIDE 5 MG: 2.5 INJECTION, SOLUTION INFILTRATION; PERINEURAL at 17:14

## 2017-12-01 RX ADMIN — METHYLPREDNISOLONE ACETATE 80 MG: 80 INJECTION, SUSPENSION INTRA-ARTICULAR; INTRALESIONAL; INTRAMUSCULAR; SOFT TISSUE at 17:11

## 2017-12-01 ASSESSMENT — ENCOUNTER SYMPTOMS
ABDOMINAL PAIN: 0
DIARRHEA: 0
SHORTNESS OF BREATH: 0
CHOKING: 0
VOICE CHANGE: 0
NAUSEA: 0
WHEEZING: 0
VOMITING: 0
CONSTIPATION: 0
COUGH: 0
TROUBLE SWALLOWING: 0

## 2017-12-01 NOTE — PROGRESS NOTES
[address] [place] [address]  Dept: [phone]  Dept Fax: [phone]        Ambulatory Follow Up      Subjective:   Luz Carlton is a 45y.o. year old male who presents to our office today for routine followup regarding his   1. Left knee pain, unspecified chronicity    2. Primary osteoarthritis of both knees    . Chief Complaint   Patient presents with    Knee Pain     bilateral       Knee Pain      Gaby Rider is here for a follow up regarding his right knee as well as for left knee pain. Patient states that his left knee feels just like his right knee. He has tried MDP and a prednisone taper, anti inflammatories, resting, modifying activities and HEP. Patient does have a lot of walking for his job. He states that he does have an increase in discomfort by the end of the day. In both knees he does have a lot of pain in the knee cap. He describes the pain as more of a throbbing. Review of Systems   Constitutional: Negative for fever. HENT: Negative for ear discharge, ear pain, hearing loss, tinnitus, trouble swallowing and voice change. Respiratory: Negative for cough, choking, shortness of breath and wheezing. Cardiovascular: Negative for chest pain, palpitations and leg swelling. Gastrointestinal: Negative for abdominal pain, constipation, diarrhea, nausea and vomiting. Musculoskeletal: Positive for arthralgias. Neurological: Negative for dizziness. I have reviewed the CC, HPI, ROS, PMH, FHX, Social History. I agree with the documentation provided by other staff, residents and have reviewed their documentation prior to providing my signature indicating agreement. Objective :   Ht 5' 7\" (1.702 m)   Wt 253 lb (114.8 kg)   BMI 39.63 kg/m²  Body mass index is 39.63 kg/m². General: Luz Carlton is a 45 y.o. male who is alert and oriented and sitting comfortably in our office.   Ortho Exam  MS:  Evaluation of the Right knee (MIN 4 VIEWS)     Weight Bearing. AP/Lateral, Right On [employer], and [employer]. Standing Status:   Future     Number of Occurrences:   1     Standing Expiration Date:   12/2/2018    (2)  20610 - [place] DRAIN/INJECT LARGE JOINT/BURSA       I have reviewed and made changes accordingly to the work scribed by Akira Zarate LPN. The documentation accurately reflects work and decisions made by me. I have also reviewed documentation completed by clinical staff.     Bienvenido Mares DO, [address] [place] [address]  12/10/2017 10:56 AM      Electronically signed by Ileana Sharif DO, FAOAO on 12/10/2017 at 10:56 AM

## 2017-12-13 ENCOUNTER — HOSPITAL ENCOUNTER (OUTPATIENT)
Dept: CT IMAGING | Age: 52
Discharge: HOME | End: 2017-12-13
Payer: COMMERCIAL

## 2017-12-13 DIAGNOSIS — R26.89 IMBALANCE: ICD-10-CM

## 2017-12-13 DIAGNOSIS — R42 DIZZINESS: ICD-10-CM

## 2017-12-13 DIAGNOSIS — R41.3 MEMORY LOSS: ICD-10-CM

## 2017-12-13 DIAGNOSIS — R27.0 ATAXIA: ICD-10-CM

## 2017-12-13 PROCEDURE — 70470 CT HEAD/BRAIN W/O & W/DYE: CPT

## 2017-12-13 PROCEDURE — 6360000004 HC RX CONTRAST MEDICATION: Performed by: INTERNAL MEDICINE

## 2017-12-13 PROCEDURE — 2580000003 HC RX 258: Performed by: INTERNAL MEDICINE

## 2017-12-13 RX ORDER — 0.9 % SODIUM CHLORIDE 0.9 %
50 INTRAVENOUS SOLUTION INTRAVENOUS ONCE
Status: COMPLETED | OUTPATIENT
Start: 2017-12-13 | End: 2017-12-13

## 2017-12-13 RX ORDER — SODIUM CHLORIDE 0.9 % (FLUSH) 0.9 %
10 SYRINGE (ML) INJECTION
Status: COMPLETED | OUTPATIENT
Start: 2017-12-13 | End: 2017-12-13

## 2017-12-13 RX ADMIN — SODIUM CHLORIDE 50 ML: 9 INJECTION, SOLUTION INTRAVENOUS at 08:12

## 2017-12-13 RX ADMIN — IOPAMIDOL 80 ML: 755 INJECTION, SOLUTION INTRAVENOUS at 08:12

## 2017-12-13 RX ADMIN — Medication 10 ML: at 08:12

## 2017-12-18 ENCOUNTER — OFFICE VISIT (OUTPATIENT)
Dept: FAMILY MEDICINE CLINIC | Age: 52
End: 2017-12-18
Payer: COMMERCIAL

## 2017-12-18 VITALS
DIASTOLIC BLOOD PRESSURE: 84 MMHG | SYSTOLIC BLOOD PRESSURE: 134 MMHG | WEIGHT: 247 LBS | OXYGEN SATURATION: 96 % | TEMPERATURE: 97.9 F | HEART RATE: 76 BPM | HEIGHT: 67 IN | BODY MASS INDEX: 38.77 KG/M2

## 2017-12-18 DIAGNOSIS — R27.0 ATAXIA: ICD-10-CM

## 2017-12-18 DIAGNOSIS — G47.9 SLEEP DISTURBANCE: ICD-10-CM

## 2017-12-18 DIAGNOSIS — R53.83 OTHER FATIGUE: ICD-10-CM

## 2017-12-18 DIAGNOSIS — R26.89 IMBALANCE: Primary | ICD-10-CM

## 2017-12-18 DIAGNOSIS — R44.3 HALLUCINATIONS: ICD-10-CM

## 2017-12-18 DIAGNOSIS — H91.90 PERCEIVED HEARING CHANGES: ICD-10-CM

## 2017-12-18 PROCEDURE — 99213 OFFICE O/P EST LOW 20 MIN: CPT | Performed by: INTERNAL MEDICINE

## 2017-12-18 ASSESSMENT — ENCOUNTER SYMPTOMS
CONSTIPATION: 0
VOICE CHANGE: 0
ABDOMINAL PAIN: 0
TROUBLE SWALLOWING: 0
PHOTOPHOBIA: 0
RHINORRHEA: 0
SHORTNESS OF BREATH: 0
SINUS PAIN: 0
DIARRHEA: 0
SINUS PRESSURE: 0

## 2017-12-18 NOTE — PROGRESS NOTES
[address] [facility] [address]  Dept: [phone]  Dept Fax: [phone]    Luz Carlton is a 45 y.o. male who presents today for his medical conditions/complaints as noted below. Luz Carlton is c/o of   Chief Complaint   Patient presents with   [address]     pt is here to review imaging of CT of head. HPI:     Here to review /check on symptoms and review cat scan of brain . CT which we did also call patient on was normal DI recommend PER radiologist if clinically indicated to obtain and MRA/MRI. Patient states he never picked up the seroquel as it was going to be 70 dollars up front. He is also worried about sleeping too much and sleeping through his alarm. He apparently is living with his daughter and grandson who is 10 months for the time being to save up some money. He states he is a very light sleeper and his grandson wakes up multiple times a night still so he states he is basically getting no sleep . Denies any real recent hallucinations or hearing voices. He states the buzzing , not ringing, has decreased somewhat since last visit. He states he has trouble concentrating and focusing as well   No weakness , no falls , no syncope. No real new symptoms. No results found for: LABA1C          ( goal A1C is < 7)   No results found for: LABMICR  No results found for: LDLCHOLESTEROL, LDLCALC    (goal LDL is <100)   AST (U/L)   Date Value   10/26/2017 21     ALT (U/L)   Date Value   10/26/2017 20     BUN (mg/dL)   Date Value   10/26/2017 14     BP Readings from Last 3 Encounters:   12/18/17 134/84   11/29/17 138/66   11/20/17 134/78          (goal 120/80)    Past Medical History:   Diagnosis Date    Sleep apnea       Past Surgical History:   Procedure Laterality Date    CHOLECYSTECTOMY      HERNIA REPAIR      MECKEL DIVERTICULUM EXCISION         No family history on file.     Social History and ear canal normal.   Nose: Nose normal.   Some bilateral excess cermuen today but no impaction    Eyes: EOM are normal. Pupils are equal, round, and reactive to light. Cardiovascular: Normal rate, regular rhythm and normal heart sounds. Pulmonary/Chest: Effort normal and breath sounds normal.   Abdominal: Soft. Bowel sounds are normal. There is no splenomegaly or hepatomegaly. There is no tenderness. Neurological: He is alert and oriented to person, place, and time. He is not disoriented. He displays no atrophy and no tremor. No cranial nerve deficit or sensory deficit. He exhibits normal muscle tone. He displays no seizure activity. Gait abnormal. Coordination normal. He displays no Babinski's sign on the right side. He displays no Babinski's sign on the left side. Reflex Scores:       Brachioradialis reflexes are 3+ on the right side and 3+ on the left side. Patellar reflexes are 3+ on the right side and 3+ on the left side. Skin: Skin is warm and dry. No rash noted. Psychiatric: He has a normal mood and affect. Nursing note and vitals reviewed. /84 (Site: Left Arm, Position: Sitting, Cuff Size: Medium Adult)   Pulse 76   Temp 97.9 °F (36.6 °C) (Tympanic)   Ht 5' 7\" (1.702 m)   Wt 247 lb (112 kg)   SpO2 96%   BMI 38.69 kg/m²     Assessment:      1. Imbalance  MRA HEAD WO CONTRAST   2. Ataxia  MRA HEAD WO CONTRAST   3. Perceived hearing changes  MRA HEAD WO CONTRAST   4. Hallucinations  MRA HEAD WO CONTRAST   5. Sleep disturbance  MRA HEAD WO CONTRAST   6. Other fatigue  MRA HEAD WO CONTRAST             Plan:      Return if symptoms worsen or fail to improve. Orders Placed This Encounter   Procedures    MRA HEAD WO CONTRAST     Standing Status:   Future     Standing Expiration Date:   12/18/2018     Order Specific Question:   Reason for exam:     Answer:   imbalance, ataxia, dizziness,     No orders of the defined types were placed in this encounter.    Based on radiology recommendations and some continued symptoms and to be thorough will check an MRA. After further discussion today with patient though I feel his lack of sleep and sleep disturbance are likely a big portion of his issues. Reviewed this with patient . Advised if there is anyone else he can stay with even for a few days for better rest and see if this helps his symptoms at all as well. Can trial melatonin as well for more natural sleep aid if you have the funds to do so. Also reviewed good rx with patient and that we can print out a coupon for patient if he wants to try the low dose seroquel as I advised I feel symptoms will improve with sleep ; needs about 8 hours a night. Pt refused coupon at this time as he states again is worried about over sleeping. Patient given educational materials - see patient instructions. Discussed use, benefit, and side effects of prescribed medications. All patient questions answered. Pt voiced understanding. Reviewed health maintenance. Instructed to continue current medications, diet and exercise. Patient agreed with treatment plan. Follow up as directed.      Electronically signed by Marica Landau, DO on 12/18/2017 at 10:01 AM

## 2017-12-27 ENCOUNTER — HOSPITAL ENCOUNTER (OUTPATIENT)
Dept: MRI IMAGING | Age: 52
Discharge: HOME | End: 2017-12-27

## 2017-12-27 DIAGNOSIS — R53.83 OTHER FATIGUE: ICD-10-CM

## 2017-12-27 DIAGNOSIS — H91.90 PERCEIVED HEARING CHANGES: ICD-10-CM

## 2017-12-27 DIAGNOSIS — R26.89 IMBALANCE: ICD-10-CM

## 2017-12-27 DIAGNOSIS — R27.0 ATAXIA: ICD-10-CM

## 2017-12-27 DIAGNOSIS — R44.3 HALLUCINATIONS: ICD-10-CM

## 2017-12-27 DIAGNOSIS — G47.9 SLEEP DISTURBANCE: ICD-10-CM

## 2017-12-27 PROCEDURE — 70544 MR ANGIOGRAPHY HEAD W/O DYE: CPT

## 2017-12-29 ENCOUNTER — OFFICE VISIT (OUTPATIENT)
Dept: ORTHOPEDIC SURGERY | Age: 52
End: 2017-12-29
Payer: COMMERCIAL

## 2017-12-29 VITALS
HEIGHT: 67 IN | DIASTOLIC BLOOD PRESSURE: 87 MMHG | WEIGHT: 237 LBS | SYSTOLIC BLOOD PRESSURE: 135 MMHG | BODY MASS INDEX: 37.2 KG/M2

## 2017-12-29 DIAGNOSIS — M17.0 PRIMARY OSTEOARTHRITIS OF BOTH KNEES: Primary | ICD-10-CM

## 2017-12-29 PROCEDURE — 99213 OFFICE O/P EST LOW 20 MIN: CPT | Performed by: ORTHOPAEDIC SURGERY

## 2017-12-29 ASSESSMENT — ENCOUNTER SYMPTOMS
COUGH: 0
NAUSEA: 0
CONSTIPATION: 0
DIARRHEA: 0

## 2017-12-29 NOTE — PROGRESS NOTES
[address] [place] [address]  Dept: [phone]  Dept Fax: [phone]        Ambulatory Follow Up      Subjective:   Kenia Hidalgo is a 47y.o. year old male who presents to our office today for routine followup regarding his   1. Primary osteoarthritis of both knees    . Chief Complaint   Patient presents with    Knee Pain     Bilateral knee pain       HPI- Kenia Hidalgo presents to the office today for follow-up after corticosteroid  injection of the bilateral knees. The patient notes 100% improvement with the injection. Patient states he has been exercising and his knees aren't holding him back. Patient states at his job he walks at least 3 miles a day. Review of Systems   Constitutional: Negative for chills and fever. Respiratory: Negative for cough. Gastrointestinal: Negative for constipation, diarrhea and nausea. Musculoskeletal: Positive for arthralgias (bilateral knee). Negative for gait problem, joint swelling and myalgias. Neurological: Negative for dizziness, weakness and numbness. I have reviewed the CC, HPI, ROS, PMH, FHX, Social History. I agree with the documentation provided by other staff, residents, and/or medical students and have reviewed their documentation prior to providing my signature indicating agreement. Objective :   General: Kenia Hidalgo is a 47 y.o. male who is alert and oriented and sitting comfortably in our office. Ortho Exam  MS:  Bilateral knee full range of motion is noted. No effusion is noted bilaterally. Patient relates without antalgia. Motor, sensory, vascular examination to bilateral lower extremities is grossly intact without focal deficits. Neuro: alert. oriented  Eyes: Extra-ocular muscles intact  Mouth: Oral mucosa moist. No perioral lesions  Pulm: Respirations unlabored and regular.   Skin: warm, well perfused  Psych:   Patient has good fund of knowledge and displays understanging of exam, diagnosis, and plan. Assessment:      1. Primary osteoarthritis of both knees       Plan:      Patient doing really well. Anti-inflammatories for any flare ups. He can follow up every 4 months as needed for injections. Call the office with any questions or concerns. Follow up:Return if symptoms worsen or fail to improve. Quinn Carmen RN am scribing for and in the presence of Dr. Leelee Duncan  12/30/2017 2:43 PM    I have reviewed and made changes accordingly to the work scribed by Robert Payton RN. The documentation accurately reflects work and decisions made by me. I have also reviewed documentation completed by clinical staff. Leelee Duncan DO, [address] [place] [address]  12/30/2017 2:44 PM      No orders of the defined types were placed in this encounter. No orders of the defined types were placed in this encounter.       Electronically signed by Cristal Hidalgo DO, [facility] on 12/30/2017 at 2:43 PM

## 2018-01-10 ENCOUNTER — OFFICE VISIT (OUTPATIENT)
Dept: ORTHOPEDIC SURGERY | Age: 53
End: 2018-01-10
Payer: COMMERCIAL

## 2018-01-10 VITALS — WEIGHT: 245.2 LBS | BODY MASS INDEX: 38.48 KG/M2 | HEIGHT: 67 IN

## 2018-01-10 DIAGNOSIS — M17.0 PRIMARY OSTEOARTHRITIS OF BOTH KNEES: Primary | ICD-10-CM

## 2018-01-10 PROCEDURE — 99213 OFFICE O/P EST LOW 20 MIN: CPT | Performed by: ORTHOPAEDIC SURGERY

## 2018-01-10 RX ORDER — METHYLPREDNISOLONE 4 MG/1
TABLET ORAL
Qty: 1 KIT | Refills: 0 | Status: SHIPPED | OUTPATIENT
Start: 2018-01-10 | End: 2018-01-24

## 2018-01-10 RX ORDER — IBUPROFEN 200 MG
800 TABLET ORAL EVERY 6 HOURS PRN
COMMUNITY

## 2018-01-10 ASSESSMENT — ENCOUNTER SYMPTOMS
NAUSEA: 0
DIARRHEA: 0
CONSTIPATION: 0
COUGH: 0

## 2018-01-17 ENCOUNTER — TELEPHONE (OUTPATIENT)
Dept: ORTHOPEDIC SURGERY | Age: 53
End: 2018-01-17

## 2018-01-24 ENCOUNTER — OFFICE VISIT (OUTPATIENT)
Dept: ORTHOPEDIC SURGERY | Age: 53
End: 2018-01-24
Payer: COMMERCIAL

## 2018-01-24 VITALS — WEIGHT: 247 LBS | HEIGHT: 67 IN | BODY MASS INDEX: 38.77 KG/M2

## 2018-01-24 DIAGNOSIS — M17.0 PRIMARY OSTEOARTHRITIS OF BOTH KNEES: Primary | ICD-10-CM

## 2018-01-24 PROCEDURE — 20610 DRAIN/INJ JOINT/BURSA W/O US: CPT | Performed by: ORTHOPAEDIC SURGERY

## 2018-01-24 PROCEDURE — 99213 OFFICE O/P EST LOW 20 MIN: CPT | Performed by: ORTHOPAEDIC SURGERY

## 2018-01-24 ASSESSMENT — ENCOUNTER SYMPTOMS
WHEEZING: 0
SHORTNESS OF BREATH: 0
BACK PAIN: 0

## 2018-01-24 NOTE — PROGRESS NOTES
comfortably in our office. Ortho Exam  MS: Patient has full range of motion of the bilateral knees. He has negative hip logroll and Stinchfield test bilaterally. Motor, sensory, vascular examination to bilateral lower extremities is grossly intact without focal deficits. Increased pain with varus or valgus Vernell's is noted bilaterally  Neuro: alert. oriented  Eyes: Extra-ocular muscles intact  Mouth: Oral mucosa moist. No perioral lesions  Pulm: Respirations unlabored and regular. Skin: warm, well perfused  Psych:   Patient has good fund of knowledge and displays understanging of exam, diagnosis, and plan. After informed consent was obtained verbally, the Bilateral knee was sterilely prepped with Betadine and locally anesthetized with ethyl chloride spray at the lateral joint line just lateral to the patellar tendon. Each knee was then injected through this injection site with a  Synvisc one  injection. The patient tolerated the procedure well without postinjection complications. A sterile dressing was applied. Assessment:      1. Primary osteoarthritis of both knees       Plan:      Bilateral knee Synvisc One injections done today. Antiinflammatories as needed for inflammation. Return 4 - 6 weeks for follow up. Follow up:Return in about 6 weeks (around 3/7/2018). ALFONZO Naranjo am scribing for and in the presence of Dr. Mike Ortiz. 2/9/2018 9:00 AM      I have reviewed and made changes accordingly to the work scribed by Claritza Kay, [facility]. The documentation accurately reflects work and decisions made by me. I have also reviewed documentation completed by clinical staff.     Mike Ortiz DO, [address]  2/9/2018 9:01 AM        Orders Placed This Encounter   Medications    Hylan (HYLAN) injection 48 mg    Hylan (HYLAN) injection 48 mg          Orders Placed This Encounter   Procedures    (2)  00174 - [place] DRAIN/INJECT LARGE JOINT/BURSA